# Patient Record
Sex: FEMALE | Race: WHITE | NOT HISPANIC OR LATINO
[De-identification: names, ages, dates, MRNs, and addresses within clinical notes are randomized per-mention and may not be internally consistent; named-entity substitution may affect disease eponyms.]

---

## 2017-12-01 ENCOUNTER — RESULT REVIEW (OUTPATIENT)
Age: 57
End: 2017-12-01

## 2019-09-06 PROBLEM — Z00.00 ENCOUNTER FOR PREVENTIVE HEALTH EXAMINATION: Status: ACTIVE | Noted: 2019-09-06

## 2019-09-09 DIAGNOSIS — Z12.31 ENCOUNTER FOR SCREENING MAMMOGRAM FOR MALIGNANT NEOPLASM OF BREAST: ICD-10-CM

## 2019-09-10 ENCOUNTER — RECORD ABSTRACTING (OUTPATIENT)
Age: 59
End: 2019-09-10

## 2019-09-10 DIAGNOSIS — Z80.0 FAMILY HISTORY OF MALIGNANT NEOPLASM OF DIGESTIVE ORGANS: ICD-10-CM

## 2019-09-10 DIAGNOSIS — Z83.71 FAMILY HISTORY OF COLONIC POLYPS: ICD-10-CM

## 2019-09-10 DIAGNOSIS — Z80.8 FAMILY HISTORY OF MALIGNANT NEOPLASM OF OTHER ORGANS OR SYSTEMS: ICD-10-CM

## 2019-09-10 DIAGNOSIS — Z87.39 PERSONAL HISTORY OF OTHER DISEASES OF THE MUSCULOSKELETAL SYSTEM AND CONNECTIVE TISSUE: ICD-10-CM

## 2019-09-10 DIAGNOSIS — N60.19 DIFFUSE CYSTIC MASTOPATHY OF UNSPECIFIED BREAST: ICD-10-CM

## 2019-09-10 DIAGNOSIS — Z82.49 FAMILY HISTORY OF ISCHEMIC HEART DISEASE AND OTHER DISEASES OF THE CIRCULATORY SYSTEM: ICD-10-CM

## 2019-09-10 LAB — CYTOLOGY CVX/VAG DOC THIN PREP: NORMAL

## 2019-10-16 ENCOUNTER — APPOINTMENT (OUTPATIENT)
Dept: OBGYN | Facility: CLINIC | Age: 59
End: 2019-10-16

## 2020-10-14 ENCOUNTER — APPOINTMENT (OUTPATIENT)
Dept: OBGYN | Facility: CLINIC | Age: 60
End: 2020-10-14
Payer: COMMERCIAL

## 2020-10-14 VITALS
BODY MASS INDEX: 25.44 KG/M2 | HEIGHT: 67.5 IN | SYSTOLIC BLOOD PRESSURE: 128 MMHG | WEIGHT: 164 LBS | DIASTOLIC BLOOD PRESSURE: 86 MMHG

## 2020-10-14 DIAGNOSIS — N81.6 RECTOCELE: ICD-10-CM

## 2020-10-14 PROCEDURE — 99396 PREV VISIT EST AGE 40-64: CPT

## 2020-10-15 DIAGNOSIS — Z12.31 ENCOUNTER FOR SCREENING MAMMOGRAM FOR MALIGNANT NEOPLASM OF BREAST: ICD-10-CM

## 2020-10-15 NOTE — REVIEW OF SYSTEMS
[Fever] : no fever [Fatigue] : no fatigue [Night Sweats] : no night sweats [Dry Eyes] : no dry eyes [Eye Discharge] : no eye discharge [Sight Problems] : no sight problems [Dyspnea] : no dyspnea [Dec Hearing] : no decreased hearing [Sore Throat] : no sore throat [Cough] : no cough [SOB on Exertion] : no shortness of breath on exertion [Chest Pain] : no chest pain [Palpitations] : no palpitations [Abdominal Pain] : no abdominal pain [Constipation] : no constipation [Vomiting] : no vomiting [Diarrhea] : diarrhea [Bloating] : no bloating [Frequency] : no frequency [Urgency] : no urgency [Incontinence] : no incontinence [Dysuria] : no dysuria [Urethral Discharge] : no urethral discharge [Abn Vaginal bleeding] : no abnormal vaginal bleeding [CVA Pain] : no CVA pain [Pelvic pain] : no pelvic pain [Genital Rash/Irritation] : no genital rash/irritation [Breast Lump] : no breast lump [Breast Pain] : no breast pain [Skin Rash] : no skin rash [Nipple Changes] : no nipple changes [Myalgias] : no myalgias [Arthralgias] : no arthralgias [Mole Changes] : no mole changes [Headache] : no headache [Joint Stiffness] : no joint stiffness [Convulsions] : no convulsions [Anxiety] : no anxiety [Dizziness] : no dizziness [Depression] : no depression [Sleep Disturbances] : no sleep disturbances [Deepening Voice] : no deepening voice [Feeling Weak] : not feeling weak [Hot Flashes] : no hot flashes [History of Anemia] : no history of anemia [Swollen Glands] : no swollen glands [Easy Bruising] : no easy bruising [Negative] : Heme/Lymph [FreeTextEntry8] : prolapse

## 2020-10-15 NOTE — HISTORY OF PRESENT ILLNESS
[Patient reported mammogram was normal] : Patient reported mammogram was normal [Patient reported breast sonogram was normal] : Patient reported breast sonogram was normal [Patient reported PAP Smear was normal] : Patient reported PAP Smear was normal [Patient reported colonoscopy was normal] : Patient reported colonoscopy was normal [postmenopausal] : postmenopausal [TextBox_4] : Ms Beltrán is a 60 yo  who presents for annual gynecological care\par \par Reviewed medical, surgical and family history\par \par PM with no bleeding or spotting\par \par About three years ago - cystocele/rectocele started to bother patient\par Reviewed options with provider and decided on pessary\par Has been using on/off - prolapse has worsened\par especially after activity - needing to keep pessary in more and more - doesn't like the discharge that develops after using for extended periods of time\par \par  [Y] : Patient is sexually active [Mammogramdate] : 2019 [BreastSonogramDate] : 2019 [TextBox_19] : BIRADS 1 [PapSmeardate] : 2017  [TextBox_31] : always normal [PGHxTotal] : 2 [ColonoscopyDate] : 2019 [[facility]] : [facility] [PGHxPremature] : 0 [PGHxAbortions] : 0 [FreeTextEntry1] :  x 2 [[facility]Living] : 2

## 2020-10-15 NOTE — PLAN
[FreeTextEntry1] : Annual gynecological exam\par cystocele/rectocele - mild uterine prolapse\par pap with hpv\par mammo with breast ultrasound\par \par prolapse - reviewed options\par given bothering daily living - surgery is likely best option\par reviewed urogynecologist best surgeon to perform\par gave some names and she will also research\par \par Answered questions\par \par Lorena Ambrose MD, PhD\par

## 2020-10-15 NOTE — PHYSICAL EXAM
[Alert] : alert [Appropriately responsive] : appropriately responsive [No Acute Distress] : no acute distress [No Lymphadenopathy] : no lymphadenopathy [Soft] : soft [Non-distended] : non-distended [Non-tender] : non-tender [No HSM] : No HSM [No Mass] : no mass [Oriented x3] : oriented x3 [FreeTextEntry3] : no nodules or enlargement [Examination Of The Breasts] : a normal appearance [Breast Palpation Diffuse Fibrous Tissue Bilateral] : fibrocystic changes [No Masses] : no breast masses were palpable [No Discharge] : no discharge [No Lesions] : no lesions  [Labia Minora] : normal [Labia Majora] : normal [Atrophy] : atrophy [Dry Mucosa] : dry mucosa [Cystocele] : a cystocele [Rectocele] : a rectocele [Pink Rugae] : pink rugae [No Bleeding] : There was no active vaginal bleeding [Normal] : normal [Enlarged ___ wks] : not enlarged [Normal Position] : in a normal position [Tenderness] : nontender [Uterine Adnexae] : normal [Mass ___ cm] : no uterine mass was palpated [FreeTextEntry5] : no masses/polyps; no CMT [FreeTextEntry6] : some uterine dissent with valsalva [FreeTextEntry4] : cystocele 1cm past hymen with valsalva [FreeTextEntry9] : deferred [FreeTextEntry8] : no pelvic masses

## 2020-10-16 LAB — HPV HIGH+LOW RISK DNA PNL CVX: NOT DETECTED

## 2020-10-19 LAB — CYTOLOGY CVX/VAG DOC THIN PREP: ABNORMAL

## 2022-01-04 ENCOUNTER — NON-APPOINTMENT (OUTPATIENT)
Age: 62
End: 2022-01-04

## 2022-01-05 ENCOUNTER — APPOINTMENT (OUTPATIENT)
Dept: OBGYN | Facility: CLINIC | Age: 62
End: 2022-01-05
Payer: COMMERCIAL

## 2022-01-05 VITALS
SYSTOLIC BLOOD PRESSURE: 125 MMHG | DIASTOLIC BLOOD PRESSURE: 74 MMHG | BODY MASS INDEX: 25.43 KG/M2 | HEIGHT: 67 IN | WEIGHT: 162 LBS

## 2022-01-05 DIAGNOSIS — R92.2 INCONCLUSIVE MAMMOGRAM: ICD-10-CM

## 2022-01-05 DIAGNOSIS — Z01.419 ENCOUNTER FOR GYNECOLOGICAL EXAMINATION (GENERAL) (ROUTINE) W/OUT ABNORMAL FINDINGS: ICD-10-CM

## 2022-01-05 DIAGNOSIS — Z12.4 ENCOUNTER FOR SCREENING FOR MALIGNANT NEOPLASM OF CERVIX: ICD-10-CM

## 2022-01-05 DIAGNOSIS — Z11.51 ENCOUNTER FOR SCREENING FOR HUMAN PAPILLOMAVIRUS (HPV): ICD-10-CM

## 2022-01-05 DIAGNOSIS — N81.4 UTEROVAGINAL PROLAPSE, UNSPECIFIED: ICD-10-CM

## 2022-01-05 DIAGNOSIS — Z12.39 ENCOUNTER FOR OTHER SCREENING FOR MALIGNANT NEOPLASM OF BREAST: ICD-10-CM

## 2022-01-05 DIAGNOSIS — N81.2 INCOMPLETE UTEROVAGINAL PROLAPSE: ICD-10-CM

## 2022-01-05 PROCEDURE — 99396 PREV VISIT EST AGE 40-64: CPT

## 2022-01-05 NOTE — REVIEW OF SYSTEMS
[Negative] : Heme/Lymph [Fever] : no fever [Fatigue] : no fatigue [Night Sweats] : no night sweats [Dry Eyes] : no dry eyes [Eye Discharge] : no eye discharge [Sight Problems] : no sight problems [Dec Hearing] : no decreased hearing [Sore Throat] : no sore throat [Dyspnea] : no dyspnea [Cough] : no cough [SOB on Exertion] : no shortness of breath on exertion [Chest Pain] : no chest pain [Palpitations] : no palpitations [Abdominal Pain] : no abdominal pain [Constipation] : no constipation [Diarrhea] : diarrhea [Vomiting] : no vomiting [Bloating] : no bloating [Urgency] : no urgency [Frequency] : no frequency [Incontinence] : no incontinence [Dysuria] : no dysuria [Urethral Discharge] : no urethral discharge [Abn Vaginal bleeding] : no abnormal vaginal bleeding [Pelvic pain] : no pelvic pain [CVA Pain] : no CVA pain [Genital Rash/Irritation] : no genital rash/irritation [Breast Pain] : no breast pain [Breast Lump] : no breast lump [Nipple Changes] : no nipple changes [Skin Rash] : no skin rash [Mole Changes] : no mole changes [Arthralgias] : no arthralgias [Myalgias] : no myalgias [Joint Stiffness] : no joint stiffness [Headache] : no headache [Dizziness] : no dizziness [Convulsions] : no convulsions [Anxiety] : no anxiety [Depression] : no depression [Sleep Disturbances] : no sleep disturbances [Deepening Voice] : no deepening voice [Feeling Weak] : not feeling weak [Hot Flashes] : no hot flashes [Easy Bruising] : no easy bruising [Swollen Glands] : no swollen glands [History of Anemia] : no history of anemia [FreeTextEntry8] : prolapse

## 2022-01-05 NOTE — PHYSICAL EXAM
[Chaperone Present] : A chaperone was present in the examining room during all aspects of the physical examination [Appropriately responsive] : appropriately responsive [Alert] : alert [No Acute Distress] : no acute distress [No Lymphadenopathy] : no lymphadenopathy [Soft] : soft [Non-tender] : non-tender [Non-distended] : non-distended [No HSM] : No HSM [No Mass] : no mass [Oriented x3] : oriented x3 [Examination Of The Breasts] : a normal appearance [Breast Palpation Diffuse Fibrous Tissue Bilateral] : fibrocystic changes [No Discharge] : no discharge [No Masses] : no breast masses were palpable [No Lesions] : no lesions  [Labia Majora] : normal [Labia Minora] : normal [Normal] : normal [Atrophy] : atrophy [Pink Rugae] : pink rugae [No Bleeding] : There was no active vaginal bleeding [Uterine Adnexae] : normal [FreeTextEntry1] : Grady [FreeTextEntry3] : no nodules or enlargement [Absent] : absent [FreeTextEntry4] : well healed vaginal cuff [FreeTextEntry6] : some uterine dissent with valsalva [FreeTextEntry9] : deferred [FreeTextEntry8] : no pelvic masses

## 2022-01-05 NOTE — HISTORY OF PRESENT ILLNESS
[Patient reported mammogram was normal] : Patient reported mammogram was normal [Patient reported breast sonogram was normal] : Patient reported breast sonogram was normal [Patient reported PAP Smear was normal] : Patient reported PAP Smear was normal [postmenopausal] : postmenopausal [Y] : Patient is sexually active [Patient reported colonoscopy was abnormal] : Patient reported colonoscopy was abnormal [TextBox_4] : Ms Beltrán is a 60 yo  who presents for annual gynecological care\par \par Reviewed medical, surgical and family history\par \par Underwent TVH, cystocele, rectocele repair last year with Dr Roberts\par very happy with results\par  [Mammogramdate] : 2021 [TextBox_19] : BIRADS 1 [BreastSonogramDate] : 2021 [PapSmeardate] : 2020 [TextBox_31] : always normal [ColonoscopyDate] : 2018 [TextBox_43] : polyp [PGHxTotal] : 2 [[facility]] : [facility] [PGHxPremature] : 0 [PGHxAbortions] : 0 [[facility]] : 2 [FreeTextEntry1] :  x 2

## 2022-01-05 NOTE — PLAN
[FreeTextEntry1] : Annual gynecological care\par Reviewed cervical cancer screening guidelines/recommendations from [facility]COG/ASCCP - given hysterectomy does not need any more\par Breast cancer screening - dense breast tissue mammo with breast ultrasound - up to date\par Colon cancer screening - 2018 - polyp\par \par \par \par Answered all questions\par \par Lorena Ambrose MD, PhD\par \par